# Patient Record
(demographics unavailable — no encounter records)

---

## 2025-01-24 NOTE — ASSESSMENT
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 I discussed with her that the plan moving forward would be a Mohs surgery to remove the lesion performed by Dr. Alarcon on 03/17/2025. Immediately following this, I will perform the reconstruction.  I explained to the patient the reconstruction will require a local advancement flap to obtain optimal cosmetic results.  I informed her about the closure process as well as the healing process. I told her that I would perform the closure with attention to cosmetic outcome, aiming to hide scar in natural skin creases.  I discussed the risk benefits and potential sequelae of surgery which she understood.  I answered her questions to her satisfaction and I will see her at the time of the procedure. I asked her to contact me sooner should changes or issues arise.

## 2025-01-24 NOTE — PHYSICAL EXAM
[de-identified] : Examination is limited to the head and neck area. The patient is a well-nourished individual who is in no acute distress and appears their stated age.   Examination of the head reveals a well healed surgical scar on the central forehead. The neck examination shows a superficial lesion on the right upper neck measuring 1.5cm in greatest dimension and well circumscribed.   There are no palpable masses in the parotid or submandibular regions. The jugular and posterior cervical chains are devoid of palpable adenopathy. There is no palpable diseases within the central compartment.   Facial nerve function is intact. No other cranial nerve deficits are identified.   The patient is able to open their mouth fully. There is no restriction in tongue mobility. Visualization and palpation of the contents of the oral cavity shows no evidence of mucosal or submucosal pathology.

## 2025-01-24 NOTE — HISTORY OF PRESENT ILLNESS
[de-identified] : Patient is a 55 year old female her for evaluation of right neck squamous cell carcinoma   Path 11/26/2024   Right neck, Biopsy: Squamous cell carcinoma in situ.   [FreeTextEntry1] : Current H&N Symptoms:   Bleeding - Denies Pain - Denies Trismus - Denies Dysphagia - Denies Odynophagia - Denies Voice Changes - Denies Otalgia - Denies Tinnitus - Denies Hearing loss - Denies Visual Changes - Denies Neck Mass - Denies SOB - Denies Decreased tongue mobility - Denies Facial nerve weakness/paresthesia - Denies Other - Denies

## 2025-01-24 NOTE — ADDENDUM
[FreeTextEntry1] : Documented by Viviana Guevara acting as a scribe for Dr. Sandeep Redd on 01/21/2025. All medical record entries made by the Scribe were at my, Dr. Sandeep Redd, direction and personally dictated by me on 01/21/2025. I have reviewed the chart and agree that the record accurately reflects my personal performance of the history, physical exam, assessment and plan. I have also personally directed, reviewed, and agree with the discharge instructions.

## 2025-01-24 NOTE — PHYSICAL EXAM
[de-identified] : Examination is limited to the head and neck area. The patient is a well-nourished individual who is in no acute distress and appears their stated age.   Examination of the head reveals a well healed surgical scar on the central forehead. The neck examination shows a superficial lesion on the right upper neck measuring 1.5cm in greatest dimension and well circumscribed.   There are no palpable masses in the parotid or submandibular regions. The jugular and posterior cervical chains are devoid of palpable adenopathy. There is no palpable diseases within the central compartment.   Facial nerve function is intact. No other cranial nerve deficits are identified.   The patient is able to open their mouth fully. There is no restriction in tongue mobility. Visualization and palpation of the contents of the oral cavity shows no evidence of mucosal or submucosal pathology.

## 2025-01-24 NOTE — PHYSICAL EXAM
[de-identified] : Examination is limited to the head and neck area. The patient is a well-nourished individual who is in no acute distress and appears their stated age.   Examination of the head reveals a well healed surgical scar on the central forehead. The neck examination shows a superficial lesion on the right upper neck measuring 1.5cm in greatest dimension and well circumscribed.   There are no palpable masses in the parotid or submandibular regions. The jugular and posterior cervical chains are devoid of palpable adenopathy. There is no palpable diseases within the central compartment.   Facial nerve function is intact. No other cranial nerve deficits are identified.   The patient is able to open their mouth fully. There is no restriction in tongue mobility. Visualization and palpation of the contents of the oral cavity shows no evidence of mucosal or submucosal pathology.

## 2025-01-24 NOTE — HISTORY OF PRESENT ILLNESS
[de-identified] : Patient is a 55 year old female her for evaluation of right neck squamous cell carcinoma   Path 11/26/2024   Right neck, Biopsy: Squamous cell carcinoma in situ.   [FreeTextEntry1] : Current H&N Symptoms:   Bleeding - Denies Pain - Denies Trismus - Denies Dysphagia - Denies Odynophagia - Denies Voice Changes - Denies Otalgia - Denies Tinnitus - Denies Hearing loss - Denies Visual Changes - Denies Neck Mass - Denies SOB - Denies Decreased tongue mobility - Denies Facial nerve weakness/paresthesia - Denies Other - Denies

## 2025-01-24 NOTE — HISTORY OF PRESENT ILLNESS
[de-identified] : Patient is a 55 year old female her for evaluation of right neck squamous cell carcinoma   Path 11/26/2024   Right neck, Biopsy: Squamous cell carcinoma in situ.   [FreeTextEntry1] : Current H&N Symptoms:   Bleeding - Denies Pain - Denies Trismus - Denies Dysphagia - Denies Odynophagia - Denies Voice Changes - Denies Otalgia - Denies Tinnitus - Denies Hearing loss - Denies Visual Changes - Denies Neck Mass - Denies SOB - Denies Decreased tongue mobility - Denies Facial nerve weakness/paresthesia - Denies Other - Denies

## 2025-03-17 NOTE — REASON FOR VISIT
[Procedure: _________] : a [unfilled] procedure visit [FreeTextEntry1] : Reconstruction of Mohs defect right neck with local flap closure

## 2025-03-17 NOTE — ADDENDUM
[FreeTextEntry1] : Documented by Isaac Arnold acting as a scribe for Dr. Sandeep Redd on 3/17/2025. All medical record entries made by the Scribe were at my, Dr. Sandeep Redd, direction and personally dictated by me on 3/17/2025. I have reviewed the chart and agree that the record accurately reflects my personal performance of the history, physical exam, assessment and plan. I have also personally directed, reviewed, and agree with the discharge instructions.

## 2025-03-17 NOTE — PROCEDURE
[FreeTextEntry1] : Reconstruction of Mohs defect right neck with local flap closure (4 cm) [FreeTextEntry3] : Preoperative Diagnosis: Squamous cell carcinoma of the right neck Postoperative Diagnosis: Same  Procedure Performed: Local advancement flap closure of Mohs defect- Right neck Surgeon: Dr. Sandeep Redd  Assistant: Monica MOREJON  Anesthesia: Local anesthesia with buffered 1% lidocaine with epinephrine 5cc  Procedure:   The patient was prepped and draped in the usual sterile fashion. Informed consent was obtained, and appropriate timeout procedures were completed.   Local anesthesia was administered using buffered lidocaine with epinephrine to achieve adequate anesthesia.   An advancement flap was designed and elevated adjacent to the defect. The skin was widely undermined to allow advancement. Excess skin on the medial and lateral aspects of the defect was excised to maintain contour. The lesion was excised down to the subcutaneous tissue.  Hemostasis was achieved with electrocautery. The flap was advanced into the defect and secured in place with 4-0 chromic and 5-0 nurolon in layers. Bacitracin was applied to the wound.   Estimated Blood Loss: 1cc Complications: None

## 2025-03-17 NOTE — ASSESSMENT
[FreeTextEntry1] : Follow-up in 1 week for wound check and suture removal.  Instructions given for wound care and patient was asked to contact us for signs of infection or if other issues arise.